# Patient Record
Sex: FEMALE | NOT HISPANIC OR LATINO | URBAN - NONMETROPOLITAN AREA
[De-identification: names, ages, dates, MRNs, and addresses within clinical notes are randomized per-mention and may not be internally consistent; named-entity substitution may affect disease eponyms.]

---

## 2021-10-18 ENCOUNTER — TRANSCRIBE ORDERS (OUTPATIENT)
Dept: ADMINISTRATIVE | Facility: HOSPITAL | Age: 36
End: 2021-10-18

## 2021-10-18 DIAGNOSIS — Z12.31 VISIT FOR SCREENING MAMMOGRAM: Primary | ICD-10-CM

## 2022-05-23 ENCOUNTER — HOSPITAL ENCOUNTER (EMERGENCY)
Age: 37
Discharge: HOME | End: 2022-05-23
Attending: EMERGENCY MEDICINE
Payer: MEDICAID

## 2022-05-23 VITALS
OXYGEN SATURATION: 99 % | HEART RATE: 95 BPM | RESPIRATION RATE: 17 BRPM | BODY MASS INDEX: 22.19 KG/M2 | HEIGHT: 70 IN | SYSTOLIC BLOOD PRESSURE: 133 MMHG | DIASTOLIC BLOOD PRESSURE: 110 MMHG | TEMPERATURE: 98.5 F | WEIGHT: 155 LBS

## 2022-05-23 DIAGNOSIS — L03.019 FELON OF FINGER: Primary | ICD-10-CM

## 2022-05-23 PROCEDURE — 6370000000 HC RX 637 (ALT 250 FOR IP): Performed by: EMERGENCY MEDICINE

## 2022-05-23 PROCEDURE — 99283 EMERGENCY DEPT VISIT LOW MDM: CPT

## 2022-05-23 PROCEDURE — 10061 I&D ABSCESS COMP/MULTIPLE: CPT

## 2022-05-23 RX ORDER — HYDROCODONE BITARTRATE AND ACETAMINOPHEN 5; 325 MG/1; MG/1
1 TABLET ORAL EVERY 6 HOURS PRN
Qty: 12 TABLET | Refills: 0 | Status: SHIPPED | OUTPATIENT
Start: 2022-05-23 | End: 2022-05-26

## 2022-05-23 RX ORDER — BACLOFEN 10 MG/1
TABLET ORAL
COMMUNITY
Start: 2022-04-04

## 2022-05-23 RX ORDER — LISINOPRIL 10 MG/1
TABLET ORAL
COMMUNITY
Start: 2022-04-04

## 2022-05-23 RX ORDER — DIVALPROEX SODIUM 500 MG/1
TABLET, DELAYED RELEASE ORAL
COMMUNITY
Start: 2022-04-04

## 2022-05-23 RX ORDER — SULFAMETHOXAZOLE AND TRIMETHOPRIM 800; 160 MG/1; MG/1
1 TABLET ORAL ONCE
Status: COMPLETED | OUTPATIENT
Start: 2022-05-23 | End: 2022-05-23

## 2022-05-23 RX ORDER — CEPHALEXIN 500 MG/1
CAPSULE ORAL
Qty: 28 CAPSULE | Refills: 0 | Status: SHIPPED | OUTPATIENT
Start: 2022-05-23

## 2022-05-23 RX ORDER — FAMOTIDINE 20 MG/1
TABLET, FILM COATED ORAL
COMMUNITY
Start: 2022-04-04

## 2022-05-23 RX ORDER — CEPHALEXIN 500 MG/1
CAPSULE ORAL
COMMUNITY
Start: 2022-05-17 | End: 2022-05-23 | Stop reason: SDUPTHER

## 2022-05-23 RX ORDER — SULFAMETHOXAZOLE AND TRIMETHOPRIM 800; 160 MG/1; MG/1
1 TABLET ORAL 2 TIMES DAILY
Qty: 20 TABLET | Refills: 0 | Status: SHIPPED | OUTPATIENT
Start: 2022-05-23 | End: 2022-06-02

## 2022-05-23 RX ORDER — HYDROCODONE BITARTRATE AND ACETAMINOPHEN 5; 325 MG/1; MG/1
1 TABLET ORAL ONCE
Status: COMPLETED | OUTPATIENT
Start: 2022-05-23 | End: 2022-05-23

## 2022-05-23 RX ADMIN — HYDROCODONE BITARTRATE AND ACETAMINOPHEN 1 TABLET: 5; 325 TABLET ORAL at 17:43

## 2022-05-23 RX ADMIN — SULFAMETHOXAZOLE AND TRIMETHOPRIM 1 TABLET: 800; 160 TABLET ORAL at 17:42

## 2022-05-23 ASSESSMENT — PAIN DESCRIPTION - LOCATION: LOCATION: FINGER (COMMENT WHICH ONE)

## 2022-05-23 ASSESSMENT — PAIN DESCRIPTION - ORIENTATION: ORIENTATION: LEFT

## 2022-05-23 ASSESSMENT — PAIN SCALES - GENERAL
PAINLEVEL_OUTOF10: 9
PAINLEVEL_OUTOF10: 7

## 2022-05-23 ASSESSMENT — LIFESTYLE VARIABLES: HOW OFTEN DO YOU HAVE A DRINK CONTAINING ALCOHOL: NEVER

## 2022-05-23 ASSESSMENT — PAIN - FUNCTIONAL ASSESSMENT: PAIN_FUNCTIONAL_ASSESSMENT: 0-10

## 2022-05-23 NOTE — ED NOTES
Patient discharged with 3 escript prescriptions; education of medications reviewed. Pt verbalized understanding of discharge instructions. All questions answered and patient understands follow up instructions.        Chanda Rebolledo RN  05/23/22 3823

## 2022-05-23 NOTE — ED PROVIDER NOTES
EMERGENCY DEPARTMENT ENCOUNTER    Patient: Toro Chiang  MRN: [id number]  : 1985  Date of Evaluation: 2022  ED Provider:  Eliud Joseph MD    CHIEF COMPLAINT  Chief Complaint   Patient presents with    Wound Infection       HPI  Toro Chiang is a 42 y.o. female who presents with severe, constant throbbing pain and swelling of the tip of the left middle finger for over a week. States she went to urgent care 7 days ago and had an incision of the fingertip and was started on a week of Keflex. She states she has been taking this for the last week but has continued to have swelling and pain in the fingertip. She states that the finger today is actually smaller than it was when she presented to urgent care a week ago. Is all isolated to the tip of the finger. Denies any decreased range of motion or loss of sensation focal weakness. Denies fever. Denies any other associated symptoms or complaints or concerns. REVIEW OF SYSTEMS    Constitutional: negative for fever, chills  Neurological: negative for HA, focal weakness, loss of sensation  Ophthalmic: negative for vision change  ENT: negative for congestion, rhinorrhea  Cardiovascular: negative for chest pain  Respiratory: negative for SOB, cough  GI: negative for abdominal pain, nausea, vomiting, diarrhea, constipation  : negative for dysuria, hematuria  Musculoskeletal: negative for myalgias, decreased ROM  Dermatological: negative for itching  Heme: Negative for bleeding, bruising      PAST MEDICAL HISTORY  History reviewed. No pertinent past medical history. CURRENT MEDICATIONS  [unfilled]    ALLERGIES  No Known Allergies    SURGICAL HISTORY  History reviewed. No pertinent surgical history. FAMILY HISTORY  History reviewed. No pertinent family history.     SOCIAL HISTORY  Social History     Socioeconomic History    Marital status: Single     Spouse name: None    Number of children: None    Years of education: None    Highest education level: None   Occupational History    None   Tobacco Use    Smoking status: None    Smokeless tobacco: None   Substance and Sexual Activity    Alcohol use: None    Drug use: None    Sexual activity: None   Other Topics Concern    None   Social History Narrative    None     Social Determinants of Health     Financial Resource Strain:     Difficulty of Paying Living Expenses: Not on file   Food Insecurity:     Worried About Running Out of Food in the Last Year: Not on file    Christine of Food in the Last Year: Not on file   Transportation Needs:     Lack of Transportation (Medical): Not on file    Lack of Transportation (Non-Medical): Not on file   Physical Activity:     Days of Exercise per Week: Not on file    Minutes of Exercise per Session: Not on file   Stress:     Feeling of Stress : Not on file   Social Connections:     Frequency of Communication with Friends and Family: Not on file    Frequency of Social Gatherings with Friends and Family: Not on file    Attends Jewish Services: Not on file    Active Member of [address] [place] [address] or Organizations: Not on file    Attends Club or Organization Meetings: Not on file    Marital Status: Not on file   Intimate Partner Violence:     Fear of Current or Ex-Partner: Not on file    Emotionally Abused: Not on file    Physically Abused: Not on file    Sexually Abused: Not on file   Housing Stability:     Unable to Pay for Housing in the Last Year: Not on file    Number of [place] in the Last Year: Not on file    Unstable Housing in the Last Year: Not on file         **Past medical, family and social histories, and nursing notes reviewed and verified by me**      PHYSICAL EXAM  VITAL SIGNS:   ED Triage Vitals   Enc Vitals Group      BP       Pulse       Resp       Temp       Temp src       SpO2       Weight       Height       Head Circumference       Peak Flow       Pain Score       Pain Loc       Pain Edu? Excl. in [address]?       Vitals during ED course were reviewed and are as charted. Constitutional: Minimal distress, Non-toxic appearance  Eyes: Conjunctiva normal, No discharge  HENT: Normocephalic, Atraumatic, bilateral external ears normal, posterior oropharynx is nonerythematous and without exudate, uvula is midline, no trismus, no \"hot potato voice\" or dysphonia, oropharynx moist  Neck: Supple, no stridor, no grossly visible or palpable masses  Cardiovascular: Regular rate and rhythm, No murmurs, No rubs, No gallops  Pulmonary/Chest: Normal breath sounds, No respiratory distress or accessory muscle use, No wheezing, crackles or rhonchi. Abdomen: Soft, nondistended and nonrigid, No tenderness or peritoneal signs, No masses, normal bowel sounds  Back: No midline point tenderness, No paraspinous muscle tenderness. No CVA tenderness  Extremities: Swelling with fluctuance and tenderness to palpation of the pulp/tuft of the distal left middle finger isolated to the volar aspect of the finger. This does not extend to the DIP joint. There does appear to be an old incision that was made on the radial lateral aspect of the distal left middle finger which is near the nailbed. Cap refill inferior tip is still brisk and less than 2 seconds. The remainder the finger otherwise appears unremarkable. Normal range of motion of all joints in the finger. Otherwise elsewhere there are no gross deformities, no edema, no tenderness  Neurologic: Normal motor function, Normal sensory function, No focal deficits  Skin: Warm, Dry, No erythema, No rash, No cyanosis, No mottling  Lymphatic: No lymphadenopathy in the following location(s): cervical  Psychiatric: Alert and oriented x3, Affect normal              RADIOLOGY/PROCEDURES/LABS/MEDICATIONS ADMINISTERED:    I have reviewed and interpreted all of the currently available lab results from this visit (if applicable):  No results found for this visit on 05/23/22.        ABNORMAL LABS:  Labs Reviewed - No data to display      IMAGING STUDIES ORDERED:  None      No orders to display         Incision and Drainage Procedure Note    Indication: finger felon    Procedure: The patient was positioned appropriately and the skin over the incision site was prepped with chlorhexidine and draped in a sterile fashion. Local anesthesia was obtained by infiltration using 1% Lidocaine without epinephrine. An incision was then made over the bilateral lateral pulp of the distal left middle finger and approximately 2 cc of purulent material was expressed. Loculations were broken up using forceps and more of the material was able to be expressed. The drainage cavity was then irrigated and dressed with bacitracin and a sterile dressing. The patient tolerated the procedure well. Complications: None          MEDICATIONS ADMINISTERED:  Medications   sulfamethoxazole-trimethoprim (BACTRIM DS;SEPTRA DS) 800-160 MG per tablet 1 tablet (1 tablet Oral Given 5/23/22 1742)   HYDROcodone-acetaminophen (NORCO) 5-325 MG per tablet 1 tablet (1 tablet Oral Given 5/23/22 1743)         COURSE & MEDICAL DECISION MAKING  Last vitals: BP (!) 133/110   Pulse 95   Temp 98.5 °F (36.9 °C) (Oral)   Resp 17   Ht 5' 10\" (1.778 m)   Wt 155 lb (70.3 kg)   LMP 05/19/2022   SpO2 99%   BMI 22.24 kg/m²     24-year-old female with a felon of the left middle finger. No clinical evidence to suggest flexor tendon synovitis. Low clinical suspicion for sepsis or osteomyelitis. I feel that this was likely inadequately incised and drained at urgent care and that he needed further incision and likely break-up of some loculations and septae. After digital block with lidocaine the patient was able to have significant pain relief. I then performed incision and drainage and was able to express out purulent fluid and break up further loculations with some hemostats. This did provide significant reduction swelling. Patient tolerated the procedure well.     I will extend her course of Keflex and also add Bactrim and referred to follow-up with a hand specialist    Additional workup and treatment in the ED as documented above. Patient reassured and will be discharged home. I have explained to the patient in appropriate terminology our work-up in the ED and their diagnosis. I have also given anticipatory guidance and expectant management of their condition as an outpatient as per my custom. The patient was given clear discharge and follow-up instructions including return to the ER immediately for worsening concerns. The patient has been advised to follow-up with their primary care physician and/or referred physician in the next two to three days or sooner if worsening and to return to the ER immediately as above with any concerns. I provided the patient counseling with regard to my customary list of strict return precautions as well as return precautions specific to the cause for today's emergency department visit. The patient will return under these provided conditions, but should also return for new concerns or further worsening. Pt and/or family understand and agree with plan. Clinical Impression:  1. Felon of finger        Disposition referral (if applicable): Aleta Pang DO  [address] [place] [phone]    Schedule an appointment as soon as possible for a visit       [facility] Emergency Department (Formerly [facility])  [facility] [address], [place], [address] [place] [address]    If symptoms worsen      Disposition medications (if applicable):  New Prescriptions    HYDROCODONE-ACETAMINOPHEN (NORCO) 5-325 MG PER TABLET    Take 1 tablet by mouth every 6 hours as needed for Pain for up to 3 days. Intended supply: 3 days.  Take lowest dose possible to manage pain    SULFAMETHOXAZOLE-TRIMETHOPRIM (BACTRIM DS) 800-160 MG PER TABLET    Take 1 tablet by mouth 2 times daily for 10 days       ED Provider Disposition Time  DISPOSITION Decision To Discharge 05/23/2022 05:34:30 PM          Electronically signed by: Reshma Talamantes M.D., 5/23/2022 5:44 PM      This dictation was created with voice recognition software. While attempts have been made to review the dictation as it is transcribed, on occasion the spoken word can be misinterpreted by the technology leading to omissions or inappropriate words, phrases or sentences.           Morenita Arguello MD  05/23/22 1156

## 2022-05-23 NOTE — ED TRIAGE NOTES
Pt arrived via triage with c/o left middle finger infection. Pt arrives stating she had it cut last week and given meds. Pt reports it was getting better but now has started swelling and having increased pain. The finger tip is very swollen and discolored. Pt appears very uncomfortable.